# Patient Record
Sex: FEMALE | URBAN - METROPOLITAN AREA
[De-identification: names, ages, dates, MRNs, and addresses within clinical notes are randomized per-mention and may not be internally consistent; named-entity substitution may affect disease eponyms.]

---

## 2022-04-05 ENCOUNTER — LAB OUTSIDE AN ENCOUNTER (OUTPATIENT)
Dept: URBAN - METROPOLITAN AREA TELEHEALTH 2 | Facility: TELEHEALTH | Age: 57
End: 2022-04-05

## 2022-04-05 ENCOUNTER — OFFICE VISIT (OUTPATIENT)
Dept: URBAN - METROPOLITAN AREA TELEHEALTH 2 | Facility: TELEHEALTH | Age: 57
End: 2022-04-05
Payer: COMMERCIAL

## 2022-04-05 ENCOUNTER — DASHBOARD ENCOUNTERS (OUTPATIENT)
Age: 57
End: 2022-04-05

## 2022-04-05 DIAGNOSIS — Z12.11 COLON CANCER SCREENING: ICD-10-CM

## 2022-04-05 PROCEDURE — 1036F TOBACCO NON-USER: CPT | Performed by: INTERNAL MEDICINE

## 2022-04-05 PROCEDURE — 99202 OFFICE O/P NEW SF 15 MIN: CPT | Performed by: INTERNAL MEDICINE

## 2022-04-05 PROCEDURE — G9903 PT SCRN TBCO ID AS NON USER: HCPCS | Performed by: INTERNAL MEDICINE

## 2022-04-05 PROCEDURE — 3017F COLORECTAL CA SCREEN DOC REV: CPT | Performed by: INTERNAL MEDICINE

## 2022-04-05 PROCEDURE — G8482 FLU IMMUNIZE ORDER/ADMIN: HCPCS | Performed by: INTERNAL MEDICINE

## 2022-04-05 PROCEDURE — G8427 DOCREV CUR MEDS BY ELIG CLIN: HCPCS | Performed by: INTERNAL MEDICINE

## 2022-04-05 PROCEDURE — G8417 CALC BMI ABV UP PARAM F/U: HCPCS | Performed by: INTERNAL MEDICINE

## 2022-04-05 RX ORDER — AMLODIPINE BESYLATE 5 MG/1
TAKE 1 TABLET BY MOUTH ONCE DAILY FOR 90 DAYS TABLET ORAL
Qty: 90 | Refills: 0 | Status: ACTIVE | COMMUNITY

## 2022-04-05 RX ORDER — POLYETHYLENE GLYCOL-3350 AND ELECTROLYTES 236; 6.74; 5.86; 2.97; 22.74 G/274.31G; G/274.31G; G/274.31G; G/274.31G; G/274.31G
1 POWDER, FOR SOLUTION ORAL ONCE
Qty: 1 | Refills: 0 | OUTPATIENT
Start: 2022-04-05 | End: 2022-04-06

## 2022-04-05 RX ORDER — HYDROXYZINE PAMOATE 50 MG/1
CAPSULE ORAL
Qty: 90 | Status: ACTIVE | COMMUNITY

## 2022-04-05 RX ORDER — BUSPIRONE HYDROCHLORIDE 15 MG/1
TABLET ORAL
Qty: 90 | Status: ACTIVE | COMMUNITY

## 2022-04-05 RX ORDER — PAROXETINE 40 MG/1
TAKE 1 TABLET BY MOUTH IN THE MORNING FOR 30 DAYS TABLET, FILM COATED ORAL
Qty: 30 | Refills: 0 | Status: ACTIVE | COMMUNITY

## 2022-04-05 NOTE — HPI-TODAY'S VISIT:
PMH of anxiety and HTN  Due for screening colonoscopy  Never had one before No change in bowel habits No FHx CRC or polyps No blood in stools

## 2022-04-06 ENCOUNTER — TELEPHONE ENCOUNTER (OUTPATIENT)
Dept: URBAN - METROPOLITAN AREA CLINIC 97 | Facility: CLINIC | Age: 57
End: 2022-04-06

## 2022-05-02 ENCOUNTER — OFFICE VISIT (OUTPATIENT)
Dept: URBAN - METROPOLITAN AREA CLINIC 70 | Facility: CLINIC | Age: 57
End: 2022-05-02

## 2022-05-16 ENCOUNTER — OFFICE VISIT (OUTPATIENT)
Dept: URBAN - METROPOLITAN AREA SURGERY CENTER 18 | Facility: SURGERY CENTER | Age: 57
End: 2022-05-16

## 2022-05-16 ENCOUNTER — TELEPHONE ENCOUNTER (OUTPATIENT)
Dept: URBAN - METROPOLITAN AREA TELEHEALTH 2 | Facility: TELEHEALTH | Age: 57
End: 2022-05-16